# Patient Record
Sex: FEMALE | Race: WHITE
[De-identification: names, ages, dates, MRNs, and addresses within clinical notes are randomized per-mention and may not be internally consistent; named-entity substitution may affect disease eponyms.]

---

## 2017-03-15 ENCOUNTER — HOSPITAL ENCOUNTER (OUTPATIENT)
Dept: HOSPITAL 45 - C.LABUPBEA | Age: 57
Discharge: NURSING, REHAB OR LONG-TERM CARE | End: 2017-03-15
Attending: HOSPITALIST
Payer: COMMERCIAL

## 2017-03-15 DIAGNOSIS — E03.9: Primary | ICD-10-CM

## 2017-03-15 DIAGNOSIS — M81.0: ICD-10-CM

## 2017-05-25 ENCOUNTER — HOSPITAL ENCOUNTER (OUTPATIENT)
Dept: HOSPITAL 45 - C.LABUPBEA | Age: 57
Discharge: NURSING, REHAB OR LONG-TERM CARE | End: 2017-05-25
Attending: FAMILY MEDICINE
Payer: COMMERCIAL

## 2017-05-25 DIAGNOSIS — E55.9: ICD-10-CM

## 2017-05-25 DIAGNOSIS — M62.81: ICD-10-CM

## 2017-05-25 DIAGNOSIS — I50.9: Primary | ICD-10-CM

## 2017-05-25 LAB
ALBUMIN/GLOB SERPL: 1 {RATIO} (ref 0.9–2)
ALP SERPL-CCNC: 72 U/L (ref 45–117)
ALT SERPL-CCNC: 17 U/L (ref 12–78)
ANION GAP SERPL CALC-SCNC: 6 MMOL/L (ref 3–11)
AST SERPL-CCNC: 9 U/L (ref 15–37)
BUN SERPL-MCNC: 14 MG/DL (ref 7–18)
BUN/CREAT SERPL: 20.3 (ref 10–20)
CALCIUM SERPL-MCNC: 9 MG/DL (ref 8.5–10.1)
CHLORIDE SERPL-SCNC: 109 MMOL/L (ref 98–107)
CO2 SERPL-SCNC: 27 MMOL/L (ref 21–32)
CREAT SERPL-MCNC: 0.7 MG/DL (ref 0.6–1.2)
EOSINOPHIL NFR BLD AUTO: 354 K/UL (ref 130–400)
GLOBULIN SER-MCNC: 3.4 GM/DL (ref 2.5–4)
GLUCOSE SERPL-MCNC: 77 MG/DL (ref 70–99)
HCT VFR BLD CALC: 41.4 % (ref 37–47)
MCH RBC QN AUTO: 28.7 PG (ref 25–34)
MCHC RBC AUTO-ENTMCNC: 32.1 G/DL (ref 32–36)
MCV RBC AUTO: 89.2 FL (ref 80–100)
PMV BLD AUTO: 10.8 FL (ref 7.4–10.4)
POTASSIUM SERPL-SCNC: 4.5 MMOL/L (ref 3.5–5.1)
RBC # BLD AUTO: 4.64 M/UL (ref 4.2–5.4)
SODIUM SERPL-SCNC: 142 MMOL/L (ref 136–145)
WBC # BLD AUTO: 9.25 K/UL (ref 4.8–10.8)

## 2017-06-02 ENCOUNTER — HOSPITAL ENCOUNTER (OUTPATIENT)
Dept: HOSPITAL 45 - C.LABUPBEA | Age: 57
Discharge: NURSING, REHAB OR LONG-TERM CARE | End: 2017-06-02
Attending: FAMILY MEDICINE
Payer: COMMERCIAL

## 2017-06-02 DIAGNOSIS — M62.81: Primary | ICD-10-CM

## 2017-06-02 LAB — CREAT SERPL-MCNC: 0.61 MG/DL (ref 0.6–1.2)

## 2017-06-13 NOTE — CODING QUERY MEDICAL NECESSITY
SUPPORTING DIAGNOSIS NEEDED





A supporting diagnosis is required for the test/procedure performed on this patient in 
order for us to be reimbursed by the patient's insurance. Please provide a supporting 
diagnosis for the following test/procedure listed below next to the test name along with 
your signature. 



*If there is no additional diagnosis for this patient that would support the following 
test/procedure please document that below next to the test/procedure.



Test(s)/Procedure(s) that require a supporting diagnosis:





* ([id number]) VITAMIN D ASSAY          DIAGNOSIS:





***DATE OF SERVICE: 5/25/17***





Provider Signature:  ______________________________  Date:  _______



Thank you  

Sergio Bishop

[facility] Information Management

Phone:  [phone]

Fax:  [phone]



Once completed, please kindly fax back to [phone]



For questions please call [phone]

## 2017-06-27 ENCOUNTER — HOSPITAL ENCOUNTER (OUTPATIENT)
Dept: HOSPITAL 45 - C.LABUPBEA | Age: 57
Discharge: NURSING, REHAB OR LONG-TERM CARE | End: 2017-06-27
Attending: NURSE PRACTITIONER
Payer: COMMERCIAL

## 2017-06-27 DIAGNOSIS — E78.5: Primary | ICD-10-CM

## 2017-06-27 LAB
CHOLEST/HDLC SERPL: 3.9 {RATIO}
GLUCOSE UR QL: 37 MG/DL
KETONES UR QL STRIP: 94 MG/DL
NITRITE UR QL STRIP: 74 MG/DL (ref 0–150)
PH UR: 146 MG/DL (ref 0–200)
VERY LOW DENSITY LIPOPROT CALC: 15 MG/DL

## 2017-10-02 ENCOUNTER — HOSPITAL ENCOUNTER (EMERGENCY)
Dept: HOSPITAL 45 - C.EDA | Age: 57
Discharge: NURSING, REHAB OR LONG-TERM CARE | End: 2017-10-02
Payer: COMMERCIAL

## 2017-10-02 VITALS — TEMPERATURE: 97.88 F

## 2017-10-02 VITALS — DIASTOLIC BLOOD PRESSURE: 73 MMHG | OXYGEN SATURATION: 94 % | HEART RATE: 62 BPM | SYSTOLIC BLOOD PRESSURE: 142 MMHG

## 2017-10-02 DIAGNOSIS — F25.9: ICD-10-CM

## 2017-10-02 DIAGNOSIS — R53.1: Primary | ICD-10-CM

## 2017-10-02 DIAGNOSIS — N30.01: ICD-10-CM

## 2017-10-02 DIAGNOSIS — I50.9: ICD-10-CM

## 2017-10-02 DIAGNOSIS — E03.9: ICD-10-CM

## 2017-10-02 DIAGNOSIS — I10: ICD-10-CM

## 2017-10-02 LAB
ALP SERPL-CCNC: 86 U/L (ref 45–117)
ALT SERPL-CCNC: 16 U/L (ref 12–78)
ANION GAP SERPL CALC-SCNC: 8 MMOL/L (ref 3–11)
APPEARANCE UR: (no result)
AST SERPL-CCNC: 11 U/L (ref 15–37)
BASOPHILS # BLD: 0.04 K/UL (ref 0–0.2)
BASOPHILS NFR BLD: 0.4 %
BILIRUB UR-MCNC: (no result) MG/DL
BUN SERPL-MCNC: 17 MG/DL (ref 7–18)
BUN/CREAT SERPL: 26.3 (ref 10–20)
CALCIUM SERPL-MCNC: 8.9 MG/DL (ref 8.5–10.1)
CHLORIDE SERPL-SCNC: 109 MMOL/L (ref 98–107)
CKMB/CK RATIO: 1.5 (ref 0–3)
CO2 SERPL-SCNC: 26 MMOL/L (ref 21–32)
COLOR UR: YELLOW
COMPLETE: YES
CREAT SERPL-MCNC: 0.64 MG/DL (ref 0.6–1.2)
EOSINOPHIL NFR BLD AUTO: 355 K/UL (ref 130–400)
GLUCOSE SERPL-MCNC: 88 MG/DL (ref 70–99)
HCT VFR BLD CALC: 42 % (ref 37–47)
IG%: 0.4 %
IMM GRANULOCYTES NFR BLD AUTO: 25.4 %
INR PPP: 1 (ref 0.9–1.1)
LYMPHOCYTES # BLD: 2.65 K/UL (ref 1.2–3.4)
MAGNESIUM SERPL-MCNC: 2.1 MG/DL (ref 1.8–2.4)
MANUAL MICROSCOPIC REQUIRED?: NO
MCH RBC QN AUTO: 27.3 PG (ref 25–34)
MCHC RBC AUTO-ENTMCNC: 31.4 G/DL (ref 32–36)
MCV RBC AUTO: 86.8 FL (ref 80–100)
MONOCYTES NFR BLD: 11.8 %
NEUTROPHILS # BLD AUTO: 3.4 %
NEUTROPHILS NFR BLD AUTO: 58.6 %
NITRITE UR QL STRIP: (no result)
PH UR STRIP: 6.5 [PH] (ref 4.5–7.5)
PMV BLD AUTO: 10 FL (ref 7.4–10.4)
POTASSIUM SERPL-SCNC: 4 MMOL/L (ref 3.5–5.1)
PROTHROMBIN TIME: 10.7 SECONDS (ref 9–12)
RBC # BLD AUTO: 4.84 M/UL (ref 4.2–5.4)
REVIEW REQ?: YES
SODIUM SERPL-SCNC: 143 MMOL/L (ref 136–145)
SP GR UR STRIP: 1.01 (ref 1–1.03)
URINE EPITHELIAL CELL AUTO: >30 /LPF (ref 0–5)
UROBILINOGEN UR-MCNC: (no result) MG/DL
WBC # BLD AUTO: 10.44 K/UL (ref 4.8–10.8)
ZZURINE CULT IF INDIC CATH: YES

## 2017-10-02 RX ADMIN — FLUCONAZOLE STA MG: 100 TABLET ORAL at 20:24

## 2017-10-02 NOTE — DIAGNOSTIC IMAGING REPORT
CHEST ONE VIEW PORTABLE



HISTORY:  57 years-old Female fever acute fever. Initial exam.



COMPARISON: Chest radiograph 6/14/2016 and 6/13/2016, 6/12/2016. 



TECHNIQUE: Portable upright AP view of the chest



FINDINGS: 

Cardiac silhouette is within normal limits. Patient is rotated to the left.

Increased lucency of the left hemithorax compared to the right is likely

secondary to aforementioned rotation. No pneumothorax or pleural effusion

identified. Chronic subsegmental opacity of the lateral right lung base suggest

atelectasis or scarring. No lobar airspace consolidation. Mild pulmonary

vascular congestion without overt pulmonary edema.



Bones are grossly intact.



IMPRESSION: 

1. Subsegmental opacity of the lateral right lung base appears unchanged from

prior studies suggesting atelectasis or scarring. Superimposed pneumonia is

thought to be less likely.

2. Mild pulmonary vascular congestion without overt pulmonary edema. 







The above report was generated using voice recognition software. It may contain

grammatical, syntax or spelling errors.







Electronically signed by:  Wero Sepulveda M.D.

10/2/2017 4:39 PM



Dictated Date/Time:  10/2/2017 4:36 PM

## 2017-10-02 NOTE — EMERGENCY ROOM VISIT NOTE
History


Report prepared by Renzo:  Joey Wilcox


Under the Supervision of:  Dr. Francisco Dominguez D.O.


First contact with patient:  15:53


Chief Complaint:  WEAKNESS


Stated Complaint:  SEMI RESPONESIVE, UNABLE TO GET LABS





History of Present Illness


The patient is a 57 year old female who presents to the Emergency Room with 

complaints of worsening weakness. Per the nursing staff, the patient lives at 

[facility], and she has been more lethargic recently. The patient's baseline is 

non-verbal with contractures.  Nursing home notes that the patient has not been 

eating or drinking much.  They attempted to obtain a urine and blood work but 

could not and consequently sent the patient to be evaluated. Today she only had 

5cc of urine when a catheter was placed. The patient is on 1-2L of oxygen at 

night while sleeping. History is limited secondary to unresponsiveness/baseline.





   Source of History:  nursing staff


   History Limited By:  other (unresponsiveness)





Review of Systems


History is limited secondary to unresponsiveness.





Past Medical & Surgical


Medical Problems:


(1) Bradycardia with 31-40 beats per minute


(2) Congestive Heart Failure Nos


(3) Hypertension Nos


(4) Hypothyroidism Nos


(5) Schizoaffective Disorder, Unspecified


(6) Senile Psychot Cond Nos


(7) SIRS (systemic inflammatory response syndrome)








Family History





No significant family history





Social History


Smoking Status:  Unknown if Ever Smoked


Drug Use:  none


Housing Status:  nursing home


Occupation Status:  disabled





Current/Historical Medications


Scheduled


Amoxicillin & Pot Clavulanate (Augmentin 875-125 mg), 875 MG PO BID


Cholecalciferol (Vitamin D3), 2,000 INTER.UNIT PO DAILY


Levothyroxine Sodium (Synthroid), 25 MCG PO DAILY


Mirtazapine Soltab (Remeron Soltab), 15 MG PO HS


Risperidone (Risperdal Consta), 25 MG IM Q14D


Senna (Senokot), 2 TAB PO BID


[Abh Gel], 1 APPLN TOP QAM





Scheduled PRN


Acetaminophen Tab (Tylenol), 650 MG PO Q4 PRN for Pain or Fever


Bisacodyl (Dulcolax), 1 SUPP NE for no bm x 10shifts


Ipratropium-Albuterol (Duoneb), 1 TREATMENT INH Q4H PRN for Shortness of Breath


Sodium Phosphate/Biphosphate (Fleet Enema), 1 EA NE DAILY PRN for Constipation





Allergies


Coded Allergies:  


     Cephalexin (Verified  Allergy, Unknown, UNK, 10/2/17)


     Chocolate (Verified  Allergy, Unknown, UNK, 10/2/17)


     Erythromycin (Verified  Allergy, Unknown, UNK, 10/2/17)


     Lithium (Verified  Allergy, Unknown, UNK, 10/2/17)


     Moxifloxacin (Verified  Allergy, Unknown, UNK, 10/2/17)





Physical Exam


Vital Signs











  Date Time  Temp Pulse Resp B/P (MAP) Pulse Ox O2 Delivery O2 Flow Rate FiO2


 


10/2/17 19:32  55 17 134/65 97 Room Air  


 


10/2/17 17:35  63 18 149/71 100 Nasal Cannula 2.0 


 


10/2/17 16:03  50      


 


10/2/17 15:50 36.6 53 18 115/57 100 Nasal Cannula 2.0 











Physical Exam


GENERAL: Laying on right side.  Chronically Ill-appearing with contracture in 

the upper extremities. On nasal cannula


EYE EXAM: Eyes closed.  Patient does look around when she opens eyes


OROPHARYNX: no exudate, no erythema, lips, buccal mucosa, and tongue normal and 

mucous membranes are dry


NECK: supple, no nuchal rigidity, no adenopathy, non-tender


LUNGS: Clear to auscultation. Normal chest wall mechanics


HEART: no murmurs, S1 normal and S2 normal 


ABDOMEN: abdomen soft, non-tender, normo-active bowel sounds, no masses, no 

rebound or guarding. 


BACK: Back is symmetrical on inspection and there is no deformity, no midline 

tenderness, no CVA tenderness. 


SKIN: no rashes and no bruising 


UPPER EXTREMITIES: upper extremities are grossly normal. 


LOWER EXTREMITIES: No pitting edema.


NEURO EXAM: Keeps eyes closed intentionally. Contracture of the upper 

extremities. Moans slightly and states don't hurt me.  Does move all 

extremities when patient was being.





Medical Decision & Procedures


ER Provider


Diagnostic Interpretation:


Radiology results as stated below per my review and the radiologist's 

interpretation: 














CHEST ONE VIEW PORTABLE





HISTORY:  57 years-old Female fever acute fever. Initial exam.





COMPARISON: Chest radiograph 6/14/2016 and 6/13/2016, 6/12/2016. 





TECHNIQUE: Portable upright AP view of the chest





FINDINGS: 


Cardiac silhouette is within normal limits. Patient is rotated to the left.


Increased lucency of the left hemithorax compared to the right is likely


secondary to aforementioned rotation. No pneumothorax or pleural effusion


identified. Chronic subsegmental opacity of the lateral right lung base suggest


atelectasis or scarring. No lobar airspace consolidation. Mild pulmonary


vascular congestion without overt pulmonary edema.





Bones are grossly intact.





IMPRESSION: 


1. Subsegmental opacity of the lateral right lung base appears unchanged from


prior studies suggesting atelectasis or scarring. Superimposed pneumonia is


thought to be less likely.


2. Mild pulmonary vascular congestion without overt pulmonary edema. 





The above report was generated using voice recognition software. It may contain


grammatical, syntax or spelling errors.





Electronically signed by:  Wero Sepulveda M.D.


10/2/2017 4:39 PM





Dictated Date/Time:  10/2/2017 4:36 PM























CT OF THE HEAD WITHOUT CONTRAST





CLINICAL HISTORY: Altered mental status.    





COMPARISON STUDY:  Head CT June 12, 2016. 





CT DOSE: 709.48 mGy.cm





TECHNIQUE: Helical axial images of the head were obtained without IV contrast.


Automated exposure control was utilized for the study.  A dose lowering


technique was utilized adhering to the principles of ALARA.








FINDINGS: Study is mildly compromised due to difficulty with positioning. No


acute intracranial hemorrhage, midline shift or mass effect is present. There is


mild atrophy and moderate white matter hypodensity which suggests small vessel


disease, greater than expected for age. This is unchanged. There are no findings


to suggest acute dural sinus thrombosis or acute territorial infarct. There are


no significant calvarial abnormalities. Mastoid air cells are clear. There is


mild mucosal thickening and secretions within the sphenoid sinuses.





IMPRESSION:  





1. No acute intracranial findings.





2. No significant change since previous exam.





3. Mild atrophy and moderate presumed small vessel disease, greater than


expected for age. 





Electronically signed by:  Michael Gomez M.D.


10/2/2017 5:16 PM





Dictated Date/Time:  10/2/2017 5:12 PM





Laboratory Results


10/2/17 16:51








Red Blood Count 4.84, Mean Corpuscular Volume 86.8, Mean Corpuscular Hemoglobin 

27.3, Mean Corpuscular Hemoglobin Concent 31.4, Mean Platelet Volume 10.0, 

Neutrophils (%) (Auto) 58.6, Lymphocytes (%) (Auto) 25.4, Monocytes (%) (Auto) 

11.8, Eosinophils (%) (Auto) 3.4, Basophils (%) (Auto) 0.4, Neutrophils # (Auto

) 6.13, Lymphocytes # (Auto) 2.65, Monocytes # (Auto) 1.23, Eosinophils # (Auto

) 0.35, Basophils # (Auto) 0.04





10/2/17 16:51

















Test


  10/2/17


00:00 10/2/17


16:51 10/2/17


16:54


 


Urine Color YELLOW   


 


Urine Appearance CLOUDY (CLEAR)   


 


Urine pH 6.5 (4.5-7.5)   


 


Urine Specific Gravity


  1.013


(1.000-1.030) 


  


 


 


Urine Protein TRACE (NEG)   


 


Urine Glucose (UA) NEG (NEG)   


 


Urine Ketones NEG (NEG)   


 


Urine Occult Blood 1+ (NEG)   


 


Urine Nitrite POS (NEG)   


 


Urine Bilirubin NEG (NEG)   


 


Urine Urobilinogen NEG (NEG)   


 


Urine Leukocyte Esterase LARGE (NEG)   


 


Urine WBC (Auto) >30 /hpf (0-5)   


 


Urine RBC (Auto)


  5-10 /hpf


(0-4) 


  


 


 


Urine Hyaline Casts (Auto) 0 /lpf (0-5)   


 


Urine Epithelial Cells (Auto) >30 /lpf (0-5)   


 


Urine Bacteria (Auto) 4+ (NEG)   


 


Urine Renal Epithelial Cells 0-5 /lpf (0-5)   


 


Urine Pathogenic Casts  /lpf (0)   


 


Urine Yeast (Auto)


  BUDDING (NONE


PRSENT) 


  


 


 


White Blood Count


  


  10.44 K/uL


(4.8-10.8) 


 


 


Red Blood Count


  


  4.84 M/uL


(4.2-5.4) 


 


 


Hemoglobin


  


  13.2 g/dL


(12.0-16.0) 


 


 


Hematocrit  42.0 % (37-47)  


 


Mean Corpuscular Volume


  


  86.8 fL


() 


 


 


Mean Corpuscular Hemoglobin


  


  27.3 pg


(25-34) 


 


 


Mean Corpuscular Hemoglobin


Concent 


  31.4 g/dl


(32-36) 


 


 


Platelet Count


  


  355 K/uL


(130-400) 


 


 


Mean Platelet Volume


  


  10.0 fL


(7.4-10.4) 


 


 


Neutrophils (%) (Auto)  58.6 %  


 


Lymphocytes (%) (Auto)  25.4 %  


 


Monocytes (%) (Auto)  11.8 %  


 


Eosinophils (%) (Auto)  3.4 %  


 


Basophils (%) (Auto)  0.4 %  


 


Neutrophils # (Auto)


  


  6.13 K/uL


(1.4-6.5) 


 


 


Lymphocytes # (Auto)


  


  2.65 K/uL


(1.2-3.4) 


 


 


Monocytes # (Auto)


  


  1.23 K/uL


(0.11-0.59) 


 


 


Eosinophils # (Auto)


  


  0.35 K/uL


(0-0.5) 


 


 


Basophils # (Auto)


  


  0.04 K/uL


(0-0.2) 


 


 


RDW Standard Deviation


  


  50.5 fL


(36.4-46.3) 


 


 


RDW Coefficient of Variation


  


  15.9 %


(11.5-14.5) 


 


 


Immature Granulocyte % (Auto)  0.4 %  


 


Immature Granulocyte # (Auto)


  


  0.04 K/uL


(0.00-0.02) 


 


 


Prothrombin Time


  


  10.7 SECONDS


(9.0-12.0) 


 


 


Prothromb Time International


Ratio 


  1.0 (0.9-1.1) 


  


 


 


Anion Gap


  


  8.0 mmol/L


(3-11) 


 


 


Estimated GFR (


American) 


  113.2 


  


 


 


Estimated GFR (Non-


American 


  97.7 


  


 


 


BUN/Creatinine Ratio  26.3 (10-20)  


 


Calcium Level


  


  8.9 mg/dl


(8.5-10.1) 


 


 


Magnesium Level


  


  2.1 mg/dl


(1.8-2.4) 


 


 


Total Bilirubin


  


  0.4 mg/dl


(0.2-1) 


 


 


Direct Bilirubin


  


  0.1 mg/dl


(0-0.2) 


 


 


Aspartate Amino Transf


(AST/SGOT) 


  11 U/L (15-37) 


  


 


 


Alanine Aminotransferase


(ALT/SGPT) 


  16 U/L (12-78) 


  


 


 


Alkaline Phosphatase


  


  86 U/L


() 


 


 


Total Creatine Kinase


  


  62 U/L


() 


 


 


Creatine Kinase MB


  


  0.9 ng/ml


(0.5-3.6) 


 


 


Creatine Kinase MB Ratio  1.5 (0-3.0)  


 


Troponin I


  


  < 0.015 ng/ml


(0-0.045) 


 


 


Total Protein


  


  7.2 gm/dl


(6.4-8.2) 


 


 


Albumin


  


  3.4 gm/dl


(3.4-5.0) 


 


 


Bedside Lactic Acid Venous


  


  


  1.89 mmol/L


(0.90-1.70)








Laboratory results per my review.





Medications Administered











 Medications


  (Trade)  Dose


 Ordered  Sig/Samuel


 Route  Start Time


 Stop Time Status Last Admin


Dose Admin


 


 Sodium Chloride  1,000 ml @ 


 999 mls/hr  Q1H1M STAT


 IV  10/2/17 17:09


 10/2/17 18:09 DC 10/2/17 17:09


999 MLS/HR


 


 Sodium Chloride  1,000 ml @ 


 999 mls/hr  Q1H1M STAT


 IV  10/2/17 18:29


 10/2/17 19:29 DC 10/2/17 19:31


999 MLS/HR











ECG


Indication:  weakness


Rate (beats per minute):  57


Rhythm:  sinus bradycardia


Findings:  no ectopy, other (Normal axis and septal Q-waves)


Comparison ECG Date:  6/14/16


Change:  no significant change





ED Course


ED COURSE: 


Vital signs were reviewed and showed situational hypertension


The patients medical record was reviewed


The above diagnostic studies were performed and reviewed.


ED treatments and interventions as stated above. 





1553: The patient was evaluated in room A11. A complete history and physical 

examination was performed.





1709: Sodium Chloride 1000 ml @ 999 mls/hr IV





1829: Sodium Chloride 1000 ml @ 999 mls/hr IV





1838: I discussed the patient's case with her doctor at [facility], and she 

states that the patient is currently at her baseline, but she just has not been 

eating or drinking very well recently.





1912: I reevaluated the patient, and she was having an IV placed and catheter 

was placed.





1958: Upon reevaluation, the patient is still at baseline.


Based on the patients age, coexisting illnesses, exam and lab findings the 

decision to treat as an outpatient was made.


The patient remained stable while under my care.


The patient appeared well at the time of discharge.





2000: Ampicillin Sodium/ Sulbactam Sodium 3000mg/ Sodium Chloride 108ml @ 200mls

/hr IV





Medical Decision


Differential diagnoses includes but is not limited to toxic, metabolic, 

infectious, traumatic, cardiac, neurologic, hematologic, psychiatric and 

inflammatory etiologies.





Patient is a 57-year-old female who per nursing home has been very weak and not 

eating and drinking.  They attempted to obtain a urine and blood work but were 

unable to and consequently sent her in the evaluated.  Patient is per heart 

side as I talked to them on the phone on 2 separate occasions at her baseline 

with moving extremities sustained don't touch me and intermittently agitated 

when catheterization performed.  CBC on BMP, LFTs, bilirubin and troponin were 

negative.  Lactic acid was slightly elevated at 1.89.  Patient has completely 

benign abdomen.  UA did suggest a UTI although contaminated. The patient's 

previous urine cultures grew out proteus and E coli sensitive to Augmentin.  

Patient was given antibiotics and discharged on Augmentin.  UA also showed 

yeast and she was given a dose of Diflucan.  Favor lactate is secondary to UTI.

  Vitals are stable.  No sign sepsis.  Patient was discharged back to [facility] 

side with UTI at her baseline with normal vitals on room air.





Medication Reconcilliation


Current Medication List:  was personally reviewed by me





Blood Pressure Screening


Patient's blood pressure:  Elevated blood pressure


Blood pressure disposition:  Elevated BP felt to be situational





Impression





 Primary Impression:  


 Weakness


 Additional Impression:  


 UTI (urinary tract infection)





Scribe Attestation


The scribe's documentation has been prepared under my direction and personally 

reviewed by me in its entirety. I confirm that the note above accurately 

reflects all work, treatment, procedures, and medical decision making performed 

by me.





Departure Information


Prescriptions





Amoxicillin & Pot Clavulanate (Augmentin 875-125 mg) 1 Tab Tab


875 MG PO BID for 7 Days, TAB


   Prov: Francisco Dominguez,          10/2/17





Referrals


Javy Barclay (PCP)





Forms


HOME CARE DOCUMENTATION FORM,                                                 

               IMPORTANT VISIT INFORMATION





Patient Instructions


My [facility]





Additional Instructions





Please follow up with your primary care doctor with in the next 24 hours.  Any 

worsening of your symptoms, please return to the ED immediately. This includes 

any fevers greater than 100.4, worsening pain, chest pain, shortness breath, 

persistent nausea, vomiting, unable to eat or drink, or any other concerning 

signs or symptoms from your standpoint.





Problem Qualifiers








 Additional Impression:  


 UTI (urinary tract infection)


 Urinary tract infection type:  acute cystitis  Hematuria presence:  with 

hematuria  Qualified Codes:  N30.01 - Acute cystitis with hematuria

## 2017-10-02 NOTE — DIAGNOSTIC IMAGING REPORT
CT OF THE HEAD WITHOUT CONTRAST



CLINICAL HISTORY: Altered mental status.    



COMPARISON STUDY:  Head CT June 12, 2016. 



CT DOSE: 709.48 mGy.cm



TECHNIQUE: Helical axial images of the head were obtained without IV contrast.

Automated exposure control was utilized for the study.  A dose lowering

technique was utilized adhering to the principles of ALARA.





FINDINGS: Study is mildly compromised due to difficulty with positioning. No

acute intracranial hemorrhage, midline shift or mass effect is present. There is

mild atrophy and moderate white matter hypodensity which suggests small vessel

disease, greater than expected for age. This is unchanged. There are no findings

to suggest acute dural sinus thrombosis or acute territorial infarct. There are

no significant calvarial abnormalities. Mastoid air cells are clear. There is

mild mucosal thickening and secretions within the sphenoid sinuses.



IMPRESSION:  



1. No acute intracranial findings.



2. No significant change since previous exam.



3. Mild atrophy and moderate presumed small vessel disease, greater than

expected for age. 







Electronically signed by:  Michael Gomez M.D.

10/2/2017 5:16 PM



Dictated Date/Time:  10/2/2017 5:12 PM

## 2017-10-04 ENCOUNTER — HOSPITAL ENCOUNTER (OUTPATIENT)
Dept: HOSPITAL 45 - C.LABUPBEA | Age: 57
Discharge: HOME | End: 2017-10-04
Attending: NURSE PRACTITIONER
Payer: COMMERCIAL

## 2017-10-04 DIAGNOSIS — E03.9: ICD-10-CM

## 2017-10-04 DIAGNOSIS — I50.9: ICD-10-CM

## 2017-10-04 DIAGNOSIS — M62.81: Primary | ICD-10-CM

## 2017-10-04 LAB
ANION GAP SERPL CALC-SCNC: 7 MMOL/L (ref 3–11)
BUN SERPL-MCNC: 18 MG/DL (ref 7–18)
BUN/CREAT SERPL: 28.7 (ref 10–20)
CALCIUM SERPL-MCNC: 9.1 MG/DL (ref 8.5–10.1)
CHLORIDE SERPL-SCNC: 111 MMOL/L (ref 98–107)
CO2 SERPL-SCNC: 27 MMOL/L (ref 21–32)
CREAT SERPL-MCNC: 0.61 MG/DL (ref 0.6–1.2)
EOSINOPHIL NFR BLD AUTO: 333 K/UL (ref 130–400)
GLUCOSE SERPL-MCNC: 90 MG/DL (ref 70–99)
HCT VFR BLD CALC: 35.6 % (ref 37–47)
MCH RBC QN AUTO: 27.8 PG (ref 25–34)
MCHC RBC AUTO-ENTMCNC: 31.7 G/DL (ref 32–36)
MCV RBC AUTO: 87.7 FL (ref 80–100)
PMV BLD AUTO: 10.2 FL (ref 7.4–10.4)
POTASSIUM SERPL-SCNC: 4 MMOL/L (ref 3.5–5.1)
RBC # BLD AUTO: 4.06 M/UL (ref 4.2–5.4)
SODIUM SERPL-SCNC: 145 MMOL/L (ref 136–145)
TSH SERPL-ACNC: 3.62 UIU/ML (ref 0.3–4.5)
WBC # BLD AUTO: 8.57 K/UL (ref 4.8–10.8)

## 2017-10-04 NOTE — PHARMACY PROGRESS NOTE
ED Pharmacist Culture FollowUp


Date of Service:


Oct 4, 2017.





Patient was sent home with a prescription for Augmentin 875mg PO BID x 7 days, 

which should cover the e coli growing from the patient's URINE culture.

## 2017-10-13 ENCOUNTER — HOSPITAL ENCOUNTER (OUTPATIENT)
Dept: HOSPITAL 45 - C.LABUPBEA | Age: 57
Discharge: NURSING, REHAB OR LONG-TERM CARE | End: 2017-10-13
Attending: NURSE PRACTITIONER
Payer: COMMERCIAL

## 2017-10-13 DIAGNOSIS — E03.9: Primary | ICD-10-CM

## 2017-12-01 ENCOUNTER — HOSPITAL ENCOUNTER (OUTPATIENT)
Dept: HOSPITAL 45 - C.LABUPBEA | Age: 57
Discharge: NURSING, REHAB OR LONG-TERM CARE | End: 2017-12-01
Attending: NURSE PRACTITIONER
Payer: COMMERCIAL

## 2017-12-01 DIAGNOSIS — I10: Primary | ICD-10-CM

## 2017-12-01 LAB — CREAT SERPL-MCNC: 0.69 MG/DL (ref 0.6–1.2)

## 2017-12-15 ENCOUNTER — HOSPITAL ENCOUNTER (OUTPATIENT)
Dept: HOSPITAL 45 - C.LABUPBEA | Age: 57
Discharge: NURSING, REHAB OR LONG-TERM CARE | End: 2017-12-15
Attending: NURSE PRACTITIONER
Payer: COMMERCIAL

## 2017-12-15 DIAGNOSIS — E03.9: Primary | ICD-10-CM

## 2018-04-11 ENCOUNTER — HOSPITAL ENCOUNTER (OUTPATIENT)
Dept: HOSPITAL 45 - C.LABUPBEA | Age: 58
Discharge: NURSING, REHAB OR LONG-TERM CARE | End: 2018-04-11
Attending: NURSE PRACTITIONER
Payer: COMMERCIAL

## 2018-04-11 DIAGNOSIS — M62.81: Primary | ICD-10-CM

## 2018-04-11 DIAGNOSIS — E03.9: ICD-10-CM

## 2018-04-11 LAB
ALBUMIN SERPL-MCNC: 3 GM/DL (ref 3.4–5)
ALP SERPL-CCNC: 88 U/L (ref 45–117)
ALT SERPL-CCNC: 15 U/L (ref 12–78)
AST SERPL-CCNC: 12 U/L (ref 15–37)
BASOPHILS # BLD: 0.07 K/UL (ref 0–0.2)
BASOPHILS NFR BLD: 0.6 %
BUN SERPL-MCNC: 19 MG/DL (ref 7–18)
CALCIUM SERPL-MCNC: 8.8 MG/DL (ref 8.5–10.1)
CO2 SERPL-SCNC: 24 MMOL/L (ref 21–32)
CREAT SERPL-MCNC: 0.82 MG/DL (ref 0.6–1.2)
EOS ABS #: 0.46 K/UL (ref 0–0.5)
EOSINOPHIL NFR BLD AUTO: 341 K/UL (ref 130–400)
GLUCOSE SERPL-MCNC: 110 MG/DL (ref 70–99)
HCT VFR BLD CALC: 40.2 % (ref 37–47)
HGB BLD-MCNC: 13.4 G/DL (ref 12–16)
IG#: 0.06 K/UL (ref 0–0.02)
IMM GRANULOCYTES NFR BLD AUTO: 30.1 %
LYMPHOCYTES # BLD: 3.44 K/UL (ref 1.2–3.4)
MCH RBC QN AUTO: 28.5 PG (ref 25–34)
MCHC RBC AUTO-ENTMCNC: 33.3 G/DL (ref 32–36)
MCV RBC AUTO: 85.4 FL (ref 80–100)
MONO ABS #: 1.55 K/UL (ref 0.11–0.59)
MONOCYTES NFR BLD: 13.5 %
NEUT ABS #: 5.86 K/UL (ref 1.4–6.5)
NEUTROPHILS # BLD AUTO: 4 %
NEUTROPHILS NFR BLD AUTO: 51.3 %
PMV BLD AUTO: 11 FL (ref 7.4–10.4)
POTASSIUM SERPL-SCNC: 4.1 MMOL/L (ref 3.5–5.1)
PROT SERPL-MCNC: 6.6 GM/DL (ref 6.4–8.2)
RED CELL DISTRIBUTION WIDTH CV: 16 % (ref 11.5–14.5)
RED CELL DISTRIBUTION WIDTH SD: 50.1 FL (ref 36.4–46.3)
SODIUM SERPL-SCNC: 138 MMOL/L (ref 136–145)
WBC # BLD AUTO: 11.44 K/UL (ref 4.8–10.8)

## 2018-04-28 ENCOUNTER — HOSPITAL ENCOUNTER (OUTPATIENT)
Dept: HOSPITAL 45 - C.LABUPBEA | Age: 58
Discharge: NURSING, REHAB OR LONG-TERM CARE | End: 2018-04-28
Attending: NURSE PRACTITIONER
Payer: COMMERCIAL

## 2018-04-28 DIAGNOSIS — R41.82: Primary | ICD-10-CM

## 2018-04-28 LAB
BASOPHILS # BLD: 0.03 K/UL (ref 0–0.2)
BASOPHILS NFR BLD: 0.3 %
BUN SERPL-MCNC: 14 MG/DL (ref 7–18)
CALCIUM SERPL-MCNC: 8.7 MG/DL (ref 8.5–10.1)
CO2 SERPL-SCNC: 26 MMOL/L (ref 21–32)
CREAT SERPL-MCNC: 0.9 MG/DL (ref 0.6–1.2)
EOS ABS #: 0.45 K/UL (ref 0–0.5)
EOSINOPHIL NFR BLD AUTO: 307 K/UL (ref 130–400)
GLUCOSE SERPL-MCNC: 80 MG/DL (ref 70–99)
HCT VFR BLD CALC: 39.4 % (ref 37–47)
HGB BLD-MCNC: 13.4 G/DL (ref 12–16)
IG#: 0.04 K/UL (ref 0–0.02)
IMM GRANULOCYTES NFR BLD AUTO: 38 %
LYMPHOCYTES # BLD: 3.72 K/UL (ref 1.2–3.4)
MCH RBC QN AUTO: 28.8 PG (ref 25–34)
MCHC RBC AUTO-ENTMCNC: 34 G/DL (ref 32–36)
MCV RBC AUTO: 84.7 FL (ref 80–100)
MONO ABS #: 1.04 K/UL (ref 0.11–0.59)
MONOCYTES NFR BLD: 10.6 %
NEUT ABS #: 4.5 K/UL (ref 1.4–6.5)
NEUTROPHILS # BLD AUTO: 4.6 %
NEUTROPHILS NFR BLD AUTO: 46.1 %
PMV BLD AUTO: 11 FL (ref 7.4–10.4)
POTASSIUM SERPL-SCNC: 4.1 MMOL/L (ref 3.5–5.1)
RED CELL DISTRIBUTION WIDTH CV: 16.3 % (ref 11.5–14.5)
RED CELL DISTRIBUTION WIDTH SD: 50.5 FL (ref 36.4–46.3)
SODIUM SERPL-SCNC: 140 MMOL/L (ref 136–145)
WBC # BLD AUTO: 9.78 K/UL (ref 4.8–10.8)